# Patient Record
Sex: FEMALE
[De-identification: names, ages, dates, MRNs, and addresses within clinical notes are randomized per-mention and may not be internally consistent; named-entity substitution may affect disease eponyms.]

---

## 2018-09-12 NOTE — RAD REPORT
EXAM DESCRIPTION:  RAD - Chest Pa And Lat (2 Views) - 9/12/2018 7:17 pm

 

CLINICAL HISTORY:  Cough and congestion, body aches, chills

 

COMPARISON:  None.

 

TECHNIQUE:  PA and lateral views of the chest were obtained.

 

FINDINGS:  The lungs are clear of focal mass or consolidation. No failure or volume overload. Interst
itial markings are prominent with the baseline for the patient unknown.   Heart size is normal and ce
ntral vasculature is within normal limits.  No pleural effusion or pneumothorax seen.  No acute bony 
finding noted.  No aortic abnormality.

 

IMPRESSION:  Baseline exam showing prominent interstitial markings. Mild viral infiltrate or mild int
erstitial edema cannot be excluded.

 

No focal consolidation to suspect bacterial pneumonia.

## 2018-09-12 NOTE — EDPHYS
Physician Documentation                                                                           

 [facility]                                                                

Name: Halle Stanley                                                                              

Age: 53 yrs                                                                                       

Sex: Female                                                                                       

: 1965                                                                                   

MRN: [id number]                                                                                   

Arrival Date: 2018                                                                          

Time: 18:34                                                                                       

Account#: [id number]                                                                            

Bed 30                                                                                            

Private MD: WYATT RASMUSSEN                                                                       

ED Physician Roopa Floyd                                                                    

HPI:                                                                                              

                                                                                             

18:59 This 53 yrs old  Female presents to ER via Ambulatory with complaints of Flu    kb  

      Symptoms.                                                                                   

18:59 The patient or guardian reports cough, that is intermittent, described as mild, with no kb  

      sputum, flu symptoms, low-grade fever, myalgias. Onset: The symptoms/episode                

      began/occurred 1 week(s) ago. Severity of symptoms: At their worst the symptoms were        

      moderate, in the emergency department the symptoms are unchanged. Modifying factors:        

      The symptoms are alleviated by nothing, the symptoms are aggravated by nothing.             

      Associated signs and symptoms: Pertinent positives: nausea, rhinorrhea, sore throat,        

      Pertinent negatives: chest pain, diarrhea, ear ache, fever, vomiting. The patient has       

      not experienced similar symptoms in the past. The patient has been recently seen by a       

      physician: the ER physician, out of Town, 1 week(s) ago, with similar presenting            

      complaints, and apparently given a diagnosis of bronchitis, given zithromax, but the        

      patient's symptoms have persisted.                                                          

                                                                                                  

Historical:                                                                                       

- Allergies:                                                                                      

18:38 No Known Allergies;                                                                     sv  

- Home Meds:                                                                                      

18:38 None [Active];                                                                          sv  

- PMHx:                                                                                           

18:38 None;                                                                                   sv  

                                                                                                  

- Immunization history:: Adult Immunizations up to date.                                          

- Social history:: Smoking status: Patient/guardian denies using tobacco.                         

- Ebola Screening: : No symptoms or risks identified at this time.                                

                                                                                                  

                                                                                                  

ROS:                                                                                              

19:01 Neck: Negative for injury, pain, and swelling, Cardiovascular: Negative for chest pain, kb  

      palpitations, and edema, Abdomen/GI: Negative for abdominal pain, nausea, vomiting,         

      diarrhea, and constipation, Back: Negative for injury and pain, : Negative for            

      injury, bleeding, discharge, and swelling, MS/Extremity: Negative for injury and            

      deformity, Skin: Negative for injury, rash, and discoloration, Neuro: Negative for          

      headache, weakness, numbness, tingling, and seizure.                                        

19:01 Constitutional: Positive for body aches, fatigue, malaise, Negative for chills, fever,      

      poor PO intake, weight loss.                                                                

19:01 ENT: Positive for rhinorrhea, sinus congestion, sore throat.                                

19:01 Respiratory: Positive for cough, Negative for dyspnea on exertion, hemoptysis,              

      orthopnea, pleurisy, shortness of breath, wheezing.                                         

                                                                                                  

Exam:                                                                                             

19:02 Constitutional:  This is a well developed, well nourished patient who is awake, alert,  kb  

      and in no acute distress. Head/Face:  Normocephalic, atraumatic. ENT:  Nares patent. No     

      nasal discharge, no septal abnormalities noted.  Tympanic membranes are normal and          

      external auditory canals are clear.  Oropharynx with no redness, swelling, or masses,       

      exudates, or evidence of obstruction, uvula midline.  Mucous membranes moist. Neck:         

      Trachea midline, no thyromegaly or masses palpated, and no cervical lymphadenopathy.        

      Supple, full range of motion without nuchal rigidity, or vertebral point tenderness.        

      No Meningismus. Chest/axilla:  Normal chest wall appearance and motion.  Nontender with     

      no deformity.  No lesions are appreciated. Cardiovascular:  Regular rate and rhythm         

      with a normal S1 and S2.  No gallops, murmurs, or rubs.  Normal PMI, no JVD.  No pulse      

      deficits. Respiratory:  Lungs have equal breath sounds bilaterally, clear to                

      auscultation and percussion.  No rales, rhonchi or wheezes noted.  No increased work of     

      breathing, no retractions or nasal flaring. Abdomen/GI:  Soft, non-tender, with normal      

      bowel sounds.  No distension or tympany.  No guarding or rebound.  No evidence of           

      tenderness throughout. Skin:  Warm, dry with normal turgor.  Normal color with no           

      rashes, no lesions, and no evidence of cellulitis. MS/ Extremity:  Pulses equal, no         

      cyanosis.  Neurovascular intact.  Full, normal range of motion. Neuro:  Awake and           

      alert, GCS 15, oriented to person, place, time, and situation.  Cranial nerves II-XII       

      grossly intact.  Motor strength 5/5 in all extremities.  Sensory grossly intact.            

      Cerebellar exam normal.  Normal gait.                                                       

                                                                                                  

Vital Signs:                                                                                      

18:39  / 97; Pulse 93; Resp 18; Temp 98.7; Pulse Ox 99% ; Weight 90.72 kg; Height 5 ft. sv  

      2 in. (157.48 cm); Pain 5/10;                                                               

19:56  / 84; Pulse 93; Resp 18; Pulse Ox 98% on R/A;                                    aj1 

18:39 Body Mass Index 36.58 (90.72 kg, 157.48 cm)                                             sv  

                                                                                                  

MDM:                                                                                              

18:44 Patient medically screened.                                                             kb  

19:01 Data reviewed: vital signs, nurses notes. Data interpreted: Pulse oximetry: on room air kb  

      is 99 %. Interpretation: normal.                                                            

20:08 Counseling: I had a detailed discussion with the patient and/or guardian regarding: the kb  

      historical points, exam findings, and any diagnostic results supporting the                 

      discharge/admit diagnosis, lab results, radiology results, the need for outpatient          

      follow up, a family practitioner, to return to the emergency department if symptoms         

      worsen or persist or if there are any questions or concerns that arise at home.             

                                                                                                  

                                                                                             

18:50 Order name: Flu; Complete Time: 19:21                                                   kb  

                                                                                             

18:50 Order name: Strep; Complete Time: 19:19                                                 kb  

                                                                                             

18:50 Order name: Chest Pa And Lat (2 Views) XRAY; Complete Time: 19:45                       kb  

                                                                                             

19:19 Order name: Throat Culture                                                              EDMS

                                                                                                  

Administered Medications:                                                                         

19:01 Drug: Albuterol 2.5 mg Route: Inhalation;                                               tl3 

20:02 Follow up: Response: No adverse reaction                                                aj1 

19:01 Drug: AtroVENT Aerosol 0.5 mg Route: Inhalation;                                        tl3 

20:02 Follow up: Response: No adverse reaction                                                aj1 

                                                                                                  

                                                                                                  

Disposition:                                                                                      

18 20:08 Discharged to Home. Impression: Acute upper respiratory infection, unspecified.    

- Condition is Stable.                                                                            

- Discharge Instructions: Upper Respiratory Infection, Adult, Easy-to-Read.                       

- Prescriptions for Prednisone 20 mg Oral Tablet - take 1 tablet by ORAL route once               

  daily for 5 days; 5 tablet. Tessalon Perles 100 mg Oral Capsule - take 1 capsule by             

  ORAL route every 8 hours As needed; 15 capsule. Albuterol Sulfate 90 mcg/actuation -            

  inhale 1-2 puff by INHALATION route every 4-6 hours; 1 Inhaler.                                 

- Work release form, Medication Reconciliation Form, Thank You Letter, Antibiotic                 

  Education, Prescription Opioid Use form.                                                        

- Follow up: Emergency Department; When: As needed; Reason: Worsening of condition.               

  Follow up: Private Physician; When: 2 - 3 days; Reason: Recheck today's complaints,             

  Continuance of care, Re-evaluation by your physician.                                           

                                                                                                  

                                                                                                  

                                                                                                  

Addendum:                                                                                         

2018                                                                                        

     18:23 Co-signature as Attending Physician, Roopa Floyd MD.                               m
a2

                                                                                                  

Signatures:                                                                                       

Dispatcher [employer]                           Gifty Villatoro, MENDOZA-KAT BRAUNP-Pauline Helton RN                     RN   aj1                                                  

Ariadna Sykes, RN                    RN   Roopa Bhatt MD MD   ma2                                                  

Kelsey Mendoza RN                       RN   tl3                                                  

                                                                                                  

Corrections: (The following items were deleted from the chart)                                    

                                                                                             

20:15 20:08 2018 20:08 Discharged to Home. Impression: Acute upper respiratory          aj1 

      infection, unspecified. Condition is Stable. Forms are Medication Reconciliation Form,      

      Thank You Letter, Antibiotic Education, Prescription Opioid Use. Follow up: Emergency       

      Department; When: As needed; Reason: Worsening of condition. Follow up: Private             

      Physician; When: 2 - 3 days; Reason: Recheck today's complaints, Continuance of care,       

      Re-evaluation by your physician. kb                                                         

                                                                                                  

**************************************************************************************************

## 2018-09-12 NOTE — ER
Nurse's Notes                                                                                     

 [facility]                                                                

Name: Halle Stanley                                                                              

Age: 53 yrs                                                                                       

Sex: Female                                                                                       

: 1965                                                                                   

MRN: [id number]                                                                                   

Arrival Date: 2018                                                                          

Time: 18:34                                                                                       

Account#: [id number]                                                                            

Bed 30                                                                                            

Private MD: WYATT RASMUSSEN                                                                       

Diagnosis: Acute upper respiratory infection, unspecified                                         

                                                                                                  

Presentation:                                                                                     

                                                                                             

18:37 Presenting complaint: Patient states: cough, sinus pressure, bodyaches, chills. Was     sv  

      seen at [place] ER on Friday and  and dx w/ viral and bronchitis/sinusitis/sore     

      throat, sent home with prescriptions and does not feel any better. Transition of care:      

      patient was not received from another setting of care. Onset of symptoms was 2018. Care prior to arrival: None.                                                      

18:37 Method Of Arrival: Ambulatory                                                           sv  

18:37 Acuity: ANTONINA 4                                                                           sv  

18:41 Risk Assessment: Do you want to hurt yourself or someone else? Patient reports no       tw2 

      desire to harm self or others. Initial Sepsis Screen: Does the patient meet any 2           

      criteria? No. Patient's initial sepsis screen is negative. Does the patient have a          

      suspected source of infection? No. Patient's initial sepsis screen is negative.             

                                                                                                  

Historical:                                                                                       

- Allergies:                                                                                      

18:38 No Known Allergies;                                                                     sv  

- Home Meds:                                                                                      

18:38 None [Active];                                                                          sv  

- PMHx:                                                                                           

18:38 None;                                                                                   sv  

                                                                                                  

- Immunization history:: Adult Immunizations up to date.                                          

- Social history:: Smoking status: Patient/guardian denies using tobacco.                         

- Ebola Screening: : No symptoms or risks identified at this time.                                

                                                                                                  

                                                                                                  

Screenin:41 Abuse screen: Denies threats or abuse. Nutritional screening: No deficits noted.        tw2 

      Tuberculosis screening: No symptoms or risk factors identified. Fall Risk None              

      identified.                                                                                 

                                                                                                  

Assessment:                                                                                       

19:01 General: Appears in no apparent distress. comfortable, slender, well groomed, well      tl3 

      developed, well nourished, Behavior is calm, cooperative, appropriate for age. Pain:        

      Denies pain. Neuro: Level of Consciousness is awake, alert, obeys commands, Oriented to     

      person, place, time, situation, Appropriate for age. Cardiovascular: Patient's skin is      

      warm and dry. Respiratory: Airway is patent Respiratory effort is even, unlabored,          

      Respiratory pattern is regular, symmetrical, Breath sounds are clear bilaterally.           

      Respiratory: Reports cough that is labored breathing. GI: No signs and/or symptoms were     

      reported involving the gastrointestinal system. : No signs and/or symptoms were           

      reported regarding the genitourinary system. EENT: No signs and/or symptoms were            

      reported regarding the EENT system. Derm: No signs and/or symptoms reported regarding       

      the dermatologic system. Musculoskeletal: No signs and/or symptoms reported regarding       

      the musculoskeletal system.                                                                 

19:56 Reassessment: Patient appears in no apparent distress at this time. No changes from     aj1 

      previously documented assessment. Patient and/or family updated on plan of care and         

      expected duration. Pain level reassessed. Patient is alert, oriented x 3, equal             

      unlabored respirations, skin warm/dry/pink. Annalisa at bedside discussing POC with pt.      

                                                                                                  

Vital Signs:                                                                                      

18:39  / 97; Pulse 93; Resp 18; Temp 98.7; Pulse Ox 99% ; Weight 90.72 kg; Height 5 ft. sv  

      2 in. (157.48 cm); Pain 5/10;                                                               

19:56  / 84; Pulse 93; Resp 18; Pulse Ox 98% on R/A;                                    aj1 

18:39 Body Mass Index 36.58 (90.72 kg, 157.48 cm)                                             sv  

                                                                                                  

ED Course:                                                                                        

18:34 Patient arrived in ED.                                                                  sb2 

18:34 WYATT RASMUSSEN is Private Physician.                                                   sb2 

18:38 Triage completed.                                                                       sv  

18:38 Arm band placed on right wrist.                                                         sv  

18:40 Patient placed in an exam room.                                                         sv  

18:41 Bed in low position. Call light in reach.                                               tw2 

18:44 [facility]                                                        kb  

18:44 Roopa Floyd MD is Attending Physician.                                           kb  

18:47 Kelsey Mendoza, RN is Primary Nurse.                                                     tl3 

19:01 No provider procedures requiring assistance completed. Patient did not have IV access   tl3 

      during this emergency room visit.                                                           

19:16 X-ray completed. Patient tolerated procedure well.                                      ml  

19:18 Chest Pa And Lat (2 Views) XRAY In Process Unspecified.                                 EDMS

                                                                                                  

Administered Medications:                                                                         

19:01 Drug: Albuterol 2.5 mg Route: Inhalation;                                               tl3 

20:02 Follow up: Response: No adverse reaction                                                aj1 

19:01 Drug: AtroVENT Aerosol 0.5 mg Route: Inhalation;                                        tl3 

20:02 Follow up: Response: No adverse reaction                                                aj1 

                                                                                                  

                                                                                                  

Outcome:                                                                                          

20:08 Discharge ordered by MD. fisher  

20:15 Patient left the ED.                                                                    aj1 

                                                                                                  

Signatures:                                                                                       

Dispatcher [employer]                           Gifty Villatoro, KADEPJOSE BRAUNP-Pauline Helton, RN                     RN   aj1                                                  

Ariadna Sykes, RN                    RN   Nubia Mathis Tara, RN                          RN   tw2                                                  

Keke Walker                               2                                                  

Kelsey Mendoza RN                       RN   tl3                                                  

                                                                                                  

**************************************************************************************************

## 2019-10-13 ENCOUNTER — HOSPITAL ENCOUNTER (EMERGENCY)
Dept: HOSPITAL 97 - ER | Age: 54
Discharge: HOME | End: 2019-10-13
Payer: COMMERCIAL

## 2019-10-13 VITALS — SYSTOLIC BLOOD PRESSURE: 133 MMHG | DIASTOLIC BLOOD PRESSURE: 82 MMHG | OXYGEN SATURATION: 98 %

## 2019-10-13 VITALS — TEMPERATURE: 99.2 F

## 2019-10-13 DIAGNOSIS — S82.831A: Primary | ICD-10-CM
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
## 2019-10-13 PROCEDURE — 2W3QX1Z IMMOBILIZATION OF RIGHT LOWER LEG USING SPLINT: ICD-10-PCS

## 2019-10-13 PROCEDURE — 99284 EMERGENCY DEPT VISIT MOD MDM: CPT

## 2019-10-13 NOTE — ER
Nurse's Notes                                                                                     

 [facility]                                                                 

Name: Halle Stanley                                                                              

Age: 54 yrs                                                                                       

Sex: Female                                                                                       

: 1965                                                                                   

MRN: [id number]                                                                                   

Arrival Date: 10/13/2019                                                                          

Time: 19:35                                                                                       

Account#: [id number]                                                                            

Bed 14                                                                                            

Private MD:                                                                                       

Diagnosis: Distal right fibula avulsion fracture                                                  

                                                                                                  

Presentation:                                                                                     

10/13                                                                                             

19:48 Presenting complaint: Patient states: walking outside in the parking lot, hit the curb  rv  

      and sprained the right ankle. did not hit head and does not take any blood thinner.         

      Care prior to arrival: None. Mechanism of Injury: Fall.                                     

19:48 Acuity: ANTONINA 4                                                                           rv  

19:48 Method Of Arrival: Wheelchair                                                           rv  

19:54 Transition of care: patient was not received from another setting of care. Onset of     rv  

      symptoms was 2019 at 19:00. Risk Assessment: Do you want to hurt yourself       

      or someone else? Patient reports no desire to harm self or others. Initial Sepsis           

      Screen: Does the patient meet any 2 criteria? No. Patient's initial sepsis screen is        

      negative. Does the patient have a suspected source of infection? No. Patient's initial      

      sepsis screen is negative.                                                                  

                                                                                                  

OB/GYN:                                                                                           

19:49 LMP N/A - Post-menopause                                                                rv  

                                                                                                  

Historical:                                                                                       

- Allergies:                                                                                      

19:51 No Known Allergies;                                                                     rv  

- Home Meds:                                                                                      

19:51 None [Active];                                                                          rv  

- PMHx:                                                                                           

19:51 None;                                                                                   rv  

- PSHx:                                                                                           

19:51 None;                                                                                   rv  

                                                                                                  

- Immunization history:: Adult Immunizations up to date.                                          

- Social history:: Smoking status: Patient/guardian denies using tobacco, never smoked.           

- Ebola Screening: : No symptoms or risks identified at this time.                                

                                                                                                  

                                                                                                  

Screenin:53 Abuse screen: Denies threats or abuse. Denies injuries from another. Nutritional        rv  

      screening: No deficits noted. Tuberculosis screening: No symptoms or risk factors           

      identified. Fall Risk None identified.                                                      

                                                                                                  

Assessment:                                                                                       

19:52 General: Appears in no apparent distress. comfortable, Behavior is calm, cooperative.   rv  

      Pain: Complains of pain in right ankle. Neuro: Level of Consciousness is awake, alert,      

      obeys commands, Oriented to person, place, time, situation. Cardiovascular: Patient's       

      skin is warm and dry. Respiratory: Airway is patent. GI: No signs and/or symptoms were      

      reported involving the gastrointestinal system. : No signs and/or symptoms were           

      reported regarding the genitourinary system. EENT: No signs and/or symptoms were            

      reported regarding the EENT system. Derm: Skin is intact. Musculoskeletal: Swelling         

      present in right ankle.                                                                     

                                                                                                  

Vital Signs:                                                                                      

19:49  / 78; Pulse 79; Resp 18; Temp 99.2; Pulse Ox 96% ;                               rv  

22:01  / 82; Pulse 73; Resp 17; Pulse Ox 98% on R/A;                                    rv  

                                                                                                  

ED Course:                                                                                        

19:35 Patient arrived in ED.                                                                  ag3 

19:42 Cameron Talley NP is PHCP.                                                           pm1 

19:42 Kareem Rae MD is Attending Physician.                                              pm1 

19:47 Trae العراقي RN is Primary Nurse.                                                  rv  

19:49 Triage completed.                                                                       rv  

19:54 Arm band placed on right wrist. Affected limb iced.                                     rv  

19:54 Patient has correct armband on for positive identification. Bed in low position. Call   rv  

      light in reach. Side rails up X 1. Pulse ox on. NIBP on.                                    

20:31 XRAY Ankle RIGHT 3 view In Process Unspecified.                                         EDMS

21:39 No provider procedures requiring assistance completed. Patient did not have IV access   rv  

      during this emergency room visit. Orthoglass splint: stirrup splint applied on right        

      leg.                                                                                        

                                                                                                  

Administered Medications:                                                                         

21:03 Drug: Tylenol #3 (300 mg-30 mg) 2 tabs {Note: rass 0.} Route: PO;                       rv  

21:39 Follow up: Response: No adverse reaction; Marked relief of symptoms; Pain is decreased; rv  

      RASS: Alert and Calm (0)                                                                    

                                                                                                  

                                                                                                  

Outcome:                                                                                          

21:20 Discharge ordered by MD.                                                                pm1 

21:40 Discharged to home via wheelchair, with crutches.                                       rv  

21:40 Condition: good                                                                             

21:40 Discharge instructions given to patient, Instructed on discharge instructions, follow       

      up and referral plans. medication usage, crutch walking, Demonstrated understanding of      

      instructions, follow-up care, medications, crutch walking, splint care.                     

21:40 Prescriptions given X 1.                                                                rv  

22:02 Patient left the ED.                                                                    rv  

                                                                                                  

Signatures:                                                                                       

Dispatcher [employer]                           EDMS                                                 

Cameron Talley NP                    NP   pm1                                                  

Trae العراقي RN                    RN   rv                                                   

Malgorzata Lyman                                 ag3                                                  

                                                                                                  

Corrections: (The following items were deleted from the chart)                                    

21:02 21:02 Tylenol #3 (300 mg-30 mg) 2 tabs PO rv                                            rv  

                                                                                                  

**************************************************************************************************

## 2019-10-13 NOTE — EDPHYS
Physician Documentation                                                                           

 [facility]                                                                 

Name: Halle Stanley                                                                              

Age: 54 yrs                                                                                       

Sex: Female                                                                                       

: 1965                                                                                   

MRN: [id number]                                                                                   

Arrival Date: 10/13/2019                                                                          

Time: 19:35                                                                                       

Account#: [id number]                                                                            

Bed 14                                                                                            

Private MD:                                                                                       

ED Physician Kareem Rae                                                                       

HPI:                                                                                              

10/13                                                                                             

20:49 This 54 yrs old  Female presents to ER via Wheelchair with complaints of Fall   pm1 

      Injury.                                                                                     

20:49 The patient presents with pain, swelling. The complaints affect the right ankle. Onset: pm1 

      The symptoms/episode began/occurred just prior to arrival. Context: The problem was         

      sustained outside, tripped on curb, The mechanism of injury involved inversion of the       

      affected ankle. The patient is unable to bear weight. Associated signs and symptoms:        

      Pertinent positives: swelling, of the right ankle, Pertinent negatives: calf                

      tenderness, numbness, tingling. Modifying factors: The symptoms are alleviated by           

      elevation of extremity, the symptoms are aggravated by weight bearing. The patient has      

      not experienced similar symptoms in the past. No headache, head injury, neck pain, LOC.     

                                                                                                  

OB/GYN:                                                                                           

19:49 LMP N/A - Post-menopause                                                                rv  

                                                                                                  

Historical:                                                                                       

- Allergies:                                                                                      

19:51 No Known Allergies;                                                                     rv  

- Home Meds:                                                                                      

19:51 None [Active];                                                                          rv  

- PMHx:                                                                                           

19:51 None;                                                                                   rv  

- PSHx:                                                                                           

19:51 None;                                                                                   rv  

                                                                                                  

- Immunization history:: Adult Immunizations up to date.                                          

- Social history:: Smoking status: Patient/guardian denies using tobacco, never smoked.           

- Ebola Screening: : No symptoms or risks identified at this time.                                

                                                                                                  

                                                                                                  

ROS:                                                                                              

20:49 Constitutional: Negative for fever, chills, and weight loss, Eyes: Negative for injury, pm1 

      pain, redness, and discharge, ENT: Negative for injury, pain, and discharge, Neck:          

      Negative for injury, pain, and swelling, Cardiovascular: Negative for chest pain,           

      palpitations, and edema, Respiratory: Negative for shortness of breath, cough,              

      wheezing, and pleuritic chest pain, Abdomen/GI: Negative for abdominal pain, nausea,        

      vomiting, diarrhea, and constipation, Back: Negative for injury and pain.                   

20:49 Skin: Negative for injury, rash, and discoloration, Neuro: Negative for headache,           

      weakness, numbness, tingling, and seizure.                                                  

20:49 MS/extremity: Positive for of the right ankle lateral aspect, Negative for decreased        

      range of motion, deformity.                                                                 

                                                                                                  

Exam:                                                                                             

20:49 Constitutional:  This is a well developed, well nourished patient who is awake, alert,  pm1 

      and in no acute distress. Head/Face:  Normocephalic, atraumatic. Neck:  Trachea             

      midline, no thyromegaly or masses palpated, and no cervical lymphadenopathy.  Supple,       

      full range of motion without nuchal rigidity, or vertebral point tenderness.  No            

      Meningismus. Chest/axilla:  Normal chest wall appearance and motion.  Nontender with no     

      deformity.  No lesions are appreciated. Cardiovascular:  Regular rate and rhythm with a     

      normal S1 and S2.  No gallops, murmurs, or rubs.  Normal PMI, no JVD.  No pulse             

      deficits. Respiratory:  Lungs have equal breath sounds bilaterally, clear to                

      auscultation and percussion.  No rales, rhonchi or wheezes noted.  No increased work of     

      breathing, no retractions or nasal flaring. Back:  No spinal tenderness.  No                

      costovertebral tenderness.  Full range of motion. Skin:  Warm, dry with normal turgor.      

      Normal color with no rashes, no lesions, and no evidence of cellulitis.                     

20:49 Musculoskeletal/extremity: Extremities: grossly normal except: noted in the lateral         

      aspect of right ankle: There is no evidence of  decreased ROM, deformity.                   

20:49 Neuro: Orientation: is normal, Motor: is normal, moves all fours, Sensation: is normal,     

      no obvious gross deficits.                                                                  

                                                                                                  

Vital Signs:                                                                                      

19:49  / 78; Pulse 79; Resp 18; Temp 99.2; Pulse Ox 96% ;                               rv  

22:01  / 82; Pulse 73; Resp 17; Pulse Ox 98% on R/A;                                    rv  

                                                                                                  

Procedures:                                                                                       

22:05 Splinting: Splint applied to right ankle using Orthoglass splint, applied by tech.      pm1 

      Examined by me, post splint application: neurovascular intact, 2+ distal pulses             

      palpable, brisk capillary refill noted, Patient tolerated well.                             

                                                                                                  

MDM:                                                                                              

19:45 Patient medically screened.                                                             pm1 

21:12 Data reviewed: vital signs. Data interpreted: Pulse oximetry: on room air is 96 %.      pm1 

      Interpretation: normal. Counseling: I had a detailed discussion with the patient and/or     

      guardian regarding: the historical points, exam findings, and any diagnostic results        

      supporting the discharge/admit diagnosis, radiology results, the need for outpatient        

      follow up, a orthopedic surgeon, to return to the emergency department if symptoms          

      worsen or persist or if there are any questions or concerns that arise at home.             

                                                                                                  

10/13                                                                                             

19:48 Order name: XRAY Ankle RIGHT 3 view                                                     rv  

10/13                                                                                             

21:12 Order name: Splint - Ankle: Orthoglass: Stirrup; Complete Time: 21:39                   pm1 

10/13                                                                                             

21:12 Order name: Crutches; Complete Time: 21:39                                              pm1 

                                                                                                  

Administered Medications:                                                                         

21:03 Drug: Tylenol #3 (300 mg-30 mg) 2 tabs {Note: rass 0.} Route: PO;                       rv  

21:39 Follow up: Response: No adverse reaction; Marked relief of symptoms; Pain is decreased; rv  

      RASS: Alert and Calm (0)                                                                    

                                                                                                  

                                                                                                  

Disposition:                                                                                      

22:52 Co-signature as Attending Physician, Kareem Rae MD.                                    

                                                                                                  

Disposition:                                                                                      

10/13/19 21:20 Discharged to Home. Impression: Distal right fibula avulsion fracture.             

- Condition is Stable.                                                                            

- Discharge Instructions: Ankle Fracture, Cast or Splint Care, Adult, Crutch Use.                 

- Prescriptions for Tylenol- Codeine #3 300-30 mg Oral Tablet - take 2 tablets by ORAL            

  route every 6 hours As needed; 20 tablet.                                                       

- Medication Reconciliation Form, Thank You Letter, Antibiotic Education, Prescription            

  Opioid Use form.                                                                                

- Follow up: Emergency Department; When: As needed; Reason: Worsening of condition.               

  Follow up: Private Physician; When: 2 - 3 days; Reason: Recheck today's complaints,             

  Continuance of care, Re-evaluation by your physician.                                           

- Problem is new.                                                                                 

- Symptoms have improved.                                                                         

                                                                                                  

                                                                                                  

                                                                                                  

Signatures:                                                                                       

Dispatcher [employer]                           EDMS                                                 

Cameron Talley, NP                    NP   pm1                                                  

Kareem Rae MD MD                                                      

Trae العراقي RN                    RN   rv                                                   

                                                                                                  

Corrections: (The following items were deleted from the chart)                                    

22:02 21:20 10/13/2019 21:20 Discharged to Home. Impression: Distal right fibula avulsion     rv  

      fracture. Condition is Stable. Forms are Medication Reconciliation Form, Thank You          

      Letter, Antibiotic Education, Prescription Opioid Use. Follow up: Emergency Department;     

      When: As needed; Reason: Worsening of condition. Follow up: Private Physician; When: 2      

      - 3 days; Reason: Recheck today's complaints, Continuance of care, Re-evaluation by         

      your physician. Problem is new. Symptoms have improved. pm1                                 

                                                                                                  

**************************************************************************************************

## 2019-10-14 NOTE — RAD REPORT
EXAM DESCRIPTION:  RAD - Ankle Right 3 View - 10/13/2019 8:31 pm

 

CLINICAL HISTORY:  Right ankle pain, trauma

 

COMPARISON:  None.

 

FINDINGS:  No gross fracture deformity is seen. There is a small focus of cortical irregularity at th
e anterior margin of the tibia at the tibiotalar joint line. A small bone avulsion is suspected. Manuela
elation is needed with pain localizing to the anterior joint line.

 

No other evidence for fracture. Dome of the talus is normally positioned. No joint effusion seen. No 
joint space narrowing.

 

Mild lateral soft tissue swelling present.

 

IMPRESSION:  Suspected small 4-5 mm bone avulsion from the anterior margin of the tibia at the tibiot
alar joint.

## 2023-03-17 ENCOUNTER — HOSPITAL ENCOUNTER (EMERGENCY)
Dept: HOSPITAL 97 - ER | Age: 58
Discharge: HOME | End: 2023-03-17
Payer: COMMERCIAL

## 2023-03-17 VITALS — OXYGEN SATURATION: 94 % | SYSTOLIC BLOOD PRESSURE: 139 MMHG | DIASTOLIC BLOOD PRESSURE: 91 MMHG

## 2023-03-17 VITALS — TEMPERATURE: 98 F

## 2023-03-17 DIAGNOSIS — S82.002A: Primary | ICD-10-CM

## 2023-03-17 PROCEDURE — 99284 EMERGENCY DEPT VISIT MOD MDM: CPT

## 2023-03-17 PROCEDURE — 73562 X-RAY EXAM OF KNEE 3: CPT

## 2023-03-17 PROCEDURE — 96375 TX/PRO/DX INJ NEW DRUG ADDON: CPT

## 2023-03-17 PROCEDURE — 96374 THER/PROPH/DIAG INJ IV PUSH: CPT

## 2023-03-17 NOTE — RAD REPORT
EXAM DESCRIPTION:  RAD - Knee Left 3 View - 3/17/2023 12:20 pm

 

CLINICAL HISTORY:  PAIN

 

COMPARISON:  No comparisons

 

TECHNIQUE:  Left knee, 3 views.

 

FINDINGS:  Transverse fracture across the patella with distraction of the superior fragment. Mild com
minution along the inferior fragment. Prominent soft tissue swelling anteriorly, could reflect a smal
l hematoma. .No joint effusion seen. No joint space narrowing.

 

IMPRESSION:  Transverse fracture across the patella. Mild comminution of the inferior fragment. Distr
action and superior displacement of the superior fragment. Probable soft tissue hematoma in the inter
vening space.

## 2023-03-17 NOTE — XMS REPORT
Continuity of Care Document

                            Created on:2023



Patient:KELLEE SAMANIEGO

Sex:Female

:1965

External Reference #:[id number]





Demographics







                          Address                   [address]



                                                    [place] [address]

 

                          Home Phone                [phone]

 

                          Work Phone                [phone]

 

                          Email Address             NONE

 

                          Preferred Language        English

 

                          Marital Status            Unknown

 

                          Mormonism Affiliation     Unknown

 

                          Race                      Unknown

 

                          Additional Race(s)        Unavailable

 

                          Ethnic Group              Unknown









Author







                          Organization              [facility]

t

 

                          Address                   [address]



                                                    [place] [address]

 

                          Phone                     [phone]









Support







                Name            Relationship    Address         Phone

 

                UNK, UNK        Unavailable     .               [phone]



                                                [place] [address] 

 

                UNK, UNK        Unavailable     .               [phone]



                                                [place] [address] 

 

                ARNULFO SANTOS    Unavailable     [address]     [phone]



                                                [address]        



                                                [place] [address] 

 

                LISANDRO BANEGAS    Unavailable     [address]     [phone]



                                                [address]        



                                                [place] [address] 

 

                MD LILLY HENDRICKS Emergency Provider [address]  [phone]



                                                [place] [address] 

 

                MENDOZA RASMUSSEN Primary Care Physician [address]    [phone]



                                                [place] [address] 

 

                LISANDRO BANEGAS    Child           [address]          Unavailable



                                                [place] [address] 









Care Team Providers







                    Name                Role                Phone

 

                    Abraham Plummer     Attending Clinician Unavailable

 

                    MARYSOLREEN_SHU     Attending Clinician Unavailable

 

                    Physician, No Primary or Family Admitting Clinician Unavaila

ble

 

                    MARYSOLREEN_ANUELA     Admitting Clinician Unavailable









Payers







           Payer Name Policy Type Policy Number Effective Date Expiration Date S

tony

 

           [place]            [id number] 2015 00:00:00         

   



           [place]                                               







Problems

This patient has no known problems.



Allergies, Adverse Reactions, Alerts







       Allergy Allergy Status Severity Reaction(s) Onset  Inactive Treating Comm

ents 

Source



       Name   Type                        Date   Date   Clinician        

 

       No Known DA     Active U             2020-0                      [facility]



       Allergie                                                     [place]



       s                                  00:00:                      [facility]



                                          00                          d[facility]



                                                                      [facility]



                                                                      l

 

       No Known DA     Active U             2020-0                      [facility]



       Allergie                                                     [place]



       s                                  00:00:                      [facility]



                                          00                          dic



                                                                      [facility]



                                                                      l







Medications

This patient has no known medications.



Procedures

This patient has no known procedures.



Encounters







        Start   End     Encounter Admission Attending Care    Care    Encounter 

Source



        Date/Time Date/Time Type    Type    Clinicians Facility Department ID   

   

 

        2020         Inpatient ANABELLE Dozier   [facility]    [id number] 

[facility]



        14:30:00                         Abraham                   [address]      [place]



                                                                        [facility]



                                                                        d[facility]



                                                                        Hospita



                                                                        l

 

        2022 Outpatient         [facility] [place] [address]

 [place]



        04:34:00 04:34:00                 [facility]                    [address]    da



                                                                        [facility]



                                                                        [facility]re[facility]



                                                                        h



                                                                        Program

 

        2022 Outpatient         [facility] [place] [address]

 [place]



        04:34:00 04:34:00                 [facility]                    0718    da



                                                                        [facility]



                                                                        [facility]re[facility]



                                                                        h



                                                                        Program

 

        2022 Outpatient         [facility] [place] [address]

 [place]



        12:50:00 12:50:00                 [facility]                    0422    da



                                                                        [facility]



                                                                        [facility]



                                                                        Outreac



                                                                        h



                                                                        Program

 

        2021 Outpatient         ANABEL[facility] Plummer   [facility]   [id number]

[facility]



        11:55:37 11:55:37                 Abraham Mcnair      [place]



                                                                        [facility]



                                                                        d[facility]



                                                                        Hospita



                                                                        l







Results







           Test Description Test Time  Test Comments Results    Result     Sourc

e



                                                       Comments   

 

           - MRI LW JNT W/O 2021            *********************         

   



           CONT RT    13:06:00              *********************            



                                            ******************[facility]            



                                            HOSPITALName:            



                                            KELLEE SAMANIEGO :            



                                            1965 Sex:            



                                            F********************            



                                            *********************            



                                            *******************            



                                            Patient Name:            



                                            KELLEE SAMANIEGO Unit            



                                            No: [id number]            



                                            EXAMS: CPT CODE:            



                                            [id number] MRI LW JNT            



                                            W/[place] [address] MRI            



                                            RIGHT ANKLE            



                                            DIAGNOSIS: 1. There            



                                            is a moderate chronic            



                                            appearing OCD lesion            



                                            which involves the            



                                            medial aspect of the            



                                            talar dome. There is            



                                            focal thinning of the            



                                            overlying subchondral            



                                            bone plate without            



                                            evidence of collapse.            



                                            2. There is mild to            



                                            moderate distal            



                                            posterior tibial            



                                            tendon tenosynovitis.            



                                            3. There is moderate            



                                            to marked             



                                            tenosynovitis of the            



                                            peroneal tendons at            



                                            the level of and            



                                            distal to the lateral            



                                            malleolus. There is            



                                            also tendinosis of            



                                            the distal peroneus            



                                            longus tendon. 4.            



                                            There is focal            



                                            chronic appearing            



                                            thickening of the            



                                            medial band proximal            



                                            plantar fascia            



                                            suggesting changes            



                                            secondary to chronic            



                                            plantar fasciitis. 5.            



                                            Focal degenerative            



                                            subcortical cystic            



                                            change involves the            



                                            superior aspect of            



                                            the cuboid adjacent            



                                            to the calcaneocuboid            



                                            joint. INDICATION:            



                                            PAIN COMPARISON: None            



                                            TECHNIQUE:            



                                            Multiplanar,            



                                            multisequence MRI is            



                                            obtained of the right            



                                            ankle without            



                                            contrast. FINDINGS:            



                                            Laterally, the            



                                            anterior and            



                                            posterior talofibular            



                                            ligaments are intact.            



                                            The calcaneofibular            



                                            ligament is intact.            



                                            The anterior and the            



                                            posterior syndesmotic            



                                            ligaments are intact.            



                                            The superior and            



                                            inferior peroneal            



                                            retinacula are            



                                            intact. Tenosynovitis            



                                            and tendinosis of the            



                                             peroneus longus and            



                                            brevis tendons are            



                                            intact. Medially, the            



                                            deltoid ligament is            



                                            intact. The spring            



                                            ligament is intact.            



                                            Tenosynovitis of the            



                                            distal posterior            



                                            tibial tendon. The            



                                            flexor digitorum            



                                            longus and flexor            



                                            hallucis longus            



                                            tendons are intact.            



                                            The tibialis anterior            



                                            tendon and extensor            



                                            tendons are intact.            



                                            The sinus tarsi is            



                                            normal. No fluid in            



                                            the retrocalcaneal            



                                            bursa. No high-grade            



                                            narrowing of the            



                                            tibiotalar joint is            



                                            seen. The Lisfranc            



                                            ligament is intact.            



                                            The Achilles tendon            



                                            is intact. Findings            



                                            involving the plantar            



                                            fascia are as            



                                            described. There is            



                                            no evidence of            



                                            muscular edema or            



                                            atrophy. Focal OCD            



                                            lesion medial talar            



                                            dome as described            



                                            above. [facility]            



                                            NAME: KELLEE SAMANIEGO            



                                            [address] PHYS:            



                                            Abraham Willams MD : 1965            



                                            AGE: 55 SEX: F            



                                            [place], [place] [address]            



                                            ACCT NO: [id number]            



                                            LOC: Y.MRI PHONE #:            



                                            [phone] EXAM            



                                            DATE: 2021            



                                            STATUS: REG CLI FAX            



                                            #: [phone] RAD            



                                            #: D/C DT PAGE 1            



                                            Signed Report            



                                            (CONTINUED) Patient            



                                            Name: KELLEE SAMANIEGO            



                                            Unit No: [id number]            



                                            EXAMS: CPT CODE:            



                                            [id number] MRI LW JNT            



                                            W/[place] [address]            



                                            (Continued) **            



                                            Electronically Signed            



                                            by FABY Anderson MD            



                                            on 2021 at 1306            



                                            ** Reported and            



                                            signed by: FABY Anderson MD CC: Abraham Plummer MD            



                                            Technologist: LAYNE Magdaleno(R)            



                                            Transcribed D/T:            



                                            2021 (1306)            



                                            [facility]            



                                            NAME: SARAHY SAMANIEGO[address] PHYS:            



                                            Abraham Willams MD : 1965            



                                            AGE: 55 SEX: F            



                                            [place] [address]            



                                            ACCT NO: [id number]            



                                            LOC: Y.MRI PHONE #:            



                                            [phone] EXAM            



                                            DATE: 2021            



                                            STATUS: REG CLI FAX            



                                            #: [phone] RAD            



                                            #: D/C DT PAGE 2            



                                            Signed Report            



                                            Patient Name:            



                                            KELLEE SAMANIEGO Unit            



                                            No: [id number] EXAMS:            



                                            CPT CODE: [id number]            



                                            MRI LW JNT W/O CONT            



                                            RT  [address] (Continued)            



                                            Orig Print D/T: S:            



                                            2021 (1309)            



                                            [facility] NAME:            



                                            [address] PHYS:            



                                            Abraham Willams MD : 1965            



                                            AGE: 55 SEX: F            



                                            [place] [address]            



                                            ACCT NO: [id number]            



                                            LOC: Y.MRI PHONE #:            



                                            [phone] EXAM            



                                            DATE: 2021            



                                            STATUS: REG CLI FAX            



                                            #: [phone] RAD            



                                            #: D/C DT PAGE 3            



                                            Signed Report            

 

           - XR CHEST 1 V 2020             Patient Name:            



                      13:02:00              KELLEE SAMANIEGO Unit            



                                            No: [id number] EXAMS:            



                                             CPT CODE: [id number]            



                                            XR CHEST 1 V 44102            



                                            IMAGES PROVIDED: One            



                                            frontal view of the            



                                            chest is provided.            



                                            COMPARISON: None            



                                            FINDINGS: The lungs            



                                            are grossly clear. No            



                                            pneumothorax or            



                                            pleural effusion. No            



                                            focal consolidation            



                                            is visualized.            



                                            Cardiac silhouette is            



                                            accentuated by            



                                            decreased lung            



                                            volume. Coronary            



                                            vasculature is upper            



                                            limits of normal. No            



                                            acute osseous            



                                            abnormality.            



                                            IMPRESSION: No acute            



                                            findings. **            



                                            Electronically Signed            



                                            by DEVONTE Rivera ** ** on            



                                            2020 at 1302 **            



                                            Reported and signed            



                                            by: Aguilar Rivera M.D.  CC: Abraham Plummer MD;            



                                            Clark Medrano MD            



                                            Technologist:            



                                            ANTONIA DONOHUE. RT(R)            



                                            Transcribed D/T:            



                                            2020 (0783)            



                                            [facility]            



                                            NAME: KELLEE SAMANIEGO            



                                            [address] [place] [address] PHYS:            



                                            Clark Bates MD            



                                            : 1965 AGE:            



                                            54 SEX: F [place] [address] ACCT NO:            



                                            [id number] LOC:            



                                            [facility] PHONE #:            



                                            [phone] EXAM            



                                            DATE: 2020            



                                            STATUS: REG [facility] FAX            



                                            #: [phone] RAD            



                                            #: D/C DT PAGE 1            



                                            Signed Report Patient            



                                            Name: KELLEE SAMANIEGO            



                                            Unit No: [id number]            



                                            EXAMS: CPT CODE:            



                                            [id number] XR CHEST 1            



                                            V 81126 (Continued)            



                                            Orig Print D/T: S:            



                                            2020 (1407)            



                                            [facility] NAME:            



                                            KELLEE SAMANIEGO [address] [place] [address] PHYS:            



                                            Clark Bates MD            



                                            : 1965 AGE:            



                                            54 SEX: F [place] [address] ACCT NO:            



                                            [id number] LOC:            



                                            [facility] PHONE #:            



                                            [phone] EXAM            



                                            DATE: 2020            



                                            STATUS: REG [facility] FAX            



                                            #: [phone] RAD            



                                            #: D/C DT PAGE 2            



                                            Signed Report            









                    Coronavirus  nCoV Bedside 2020 12:49:00 









                      Test Item  Value      Reference Range Interpretation Comme

nts









             Coronavirus 2019 nCoV Bedside (test code = COVNONPUIBED) Negative

## 2023-03-17 NOTE — ER
Nurse's Notes                                                                                     

 [facility]                                                                 

Name: Halle Stanley                                                                              

Age: 57 yrs                                                                                       

Sex: Female                                                                                       

: 1965                                                                                   

MRN: [id number]                                                                                   

Arrival Date: 2023                                                                          

Time: 11:13                                                                                       

Account#: [id number]                                                                            

Bed 4                                                                                             

Private MD:                                                                                       

Diagnosis: Fracture of patella                                                                    

                                                                                                  

Presentation:                                                                                     

                                                                                             

11:13 Chief complaint: Patient states: Slip on oily surface 30 min PTA. L knee pain since. No ll1 

      LOC or head injury. Coronavirus screen: Vaccine status: Patient reports receiving the       

      2nd dose of the covid vaccine. Client denies travel out of the [place] in the last 14          

      days. At this time, the client does not indicate any symptoms associated with               

      coronavirus-19. Ebola Screen: Patient denies travel to an Ebola-affected area in the 21     

      days before illness onset. Initial Sepsis Screen: Does the patient meet any 2 criteria?     

      No. Patient's initial sepsis screen is negative. Does the patient have a suspected          

      source of infection? No. Patient's initial sepsis screen is negative. Risk Assessment:      

      Do you want to hurt yourself or someone else? Patient reports no desire to harm self or     

      others. Onset of symptoms was 2023.                                               

11:13 Method Of Arrival: EMS: [facility]                                                    ll1 

11:13 Acuity: ANTONINA 3                                                                           ll1 

                                                                                                  

Triage Assessment:                                                                                

11:15 General: Appears uncomfortable, Behavior is calm, cooperative, appropriate for age.     ll1 

      Pain: Complains of pain in L knee Quality of pain is described as aching.                   

      Musculoskeletal: Circulation, motion, and sensation intact. Capillary refill < 3            

      seconds, Tenderness present in L knee. Injury Description: Bruise sustained to left leg.    

                                                                                                  

Historical:                                                                                       

- Allergies:                                                                                      

11:15 No Known Allergies;                                                                     ll1 

- PMHx:                                                                                           

11:15 None;                                                                                   ll1 

- PSHx:                                                                                           

11:15 None;                                                                                   ll1 

                                                                                                  

- Immunization history:: Adult Immunizations up to date.                                          

- Social history:: Smoking status: Patient denies any tobacco usage or history of.                

- Family history:: not pertinent.                                                                 

                                                                                                  

                                                                                                  

Screenin:24 [facility] ED Fall Risk Assessment (Adult) History of falling in the last 3 months,       ll1 

      including since admission Yes- single mechanical fall (1 pt) Impaired Gait Yes (1 pt)       

      Mobility Assist Device Used Yes (1 pt) Score/Fall Risk Level 3 or more points = High        

      Risk Oriented to surroundings, Maintained a safe environment, Educated pt \T\ family on     

      fall prevention, incl call for assistance when getting out of bed, Hourly rounding          

      (assess needs \T\ fall precautionary measures) done, Remained with patient while            

      ambulating. Abuse screen: Denies threats or abuse. Nutritional screening: No deficits       

      noted. Tuberculosis screening: No symptoms or risk factors identified.                      

                                                                                                  

Assessment:                                                                                       

11:24 Reassessment: No changes from previously documented assessment. Patient and/or family   ll1 

      updated on plan of care and expected duration. Pain level reassessed. Patient is alert,     

      oriented x 3, equal unlabored respirations, skin warm/dry/pink.                             

11:53 Reassessment: No changes from previously documented assessment. Patient and/or family   ll1 

      updated on plan of care and expected duration. Pain level reassessed. Patient is alert,     

      oriented x 3, equal unlabored respirations, skin warm/dry/pink.                             

12:03 Reassessment: No changes from previously documented assessment. Patient and/or family   ll1 

      updated on plan of care and expected duration. Pain level reassessed. Patient is alert,     

      oriented x 3, equal unlabored respirations, skin warm/dry/pink.                             

12:50 Reassessment: No changes from previously documented assessment. Patient and/or family   ll1 

      updated on plan of care and expected duration. Pain level reassessed. Patient is alert,     

      oriented x 3, equal unlabored respirations, skin warm/dry/pink.                             

12:58 Reassessment: No changes from previously documented assessment. urinated on bed pan.    ll1 

      Tolerated well.                                                                             

                                                                                                  

Vital Signs:                                                                                      

11:13  / 76; Pulse 100; Resp 18; Temp 98; Pulse Ox 100% ; Pain 10/10;                   ll1 

12:03  / 76; Pulse 96; Resp 17; Pulse Ox 98% on R/A;                                    ll1 

12:50  / 91; Pulse 91; Pulse Ox 94% on R/A;                                             ll1 

11:13 Pain Scale: Adult                                                                       ll1 

                                                                                                  

ED Course:                                                                                        

11:13 Patient arrived in ED.                                                                  ll1 

11:13 Partha Gardner MD is Attending Physician.                                            rt  

11:15 Triage completed.                                                                       ll1 

11:15 Valerie Cruz, RN is Primary Nurse.                                                     ll1 

11:16 Arm band placed on Patient placed in an exam room, on a stretcher.                      ll1 

11:25 No provider procedures requiring assistance completed. Inserted saline lock: 20 gauge   ll1 

      in right antecubital area, using aseptic technique. Blood collected.                        

12:03 Patient has correct armband on for positive identification. Bed in low position. Call   ll1 

      light in reach. Side rails up X2. Client placed on continuous cardiac and pulse             

      oximetry monitoring. NIBP monitoring applied.                                               

12:22 Knee Left 3 View XRAY In Process Unspecified.                                           EDMS

13:12 Sunny Cadena MD is Referral Physician.                                                rt  

                                                                                                  

Administered Medications:                                                                         

11:25 Drug: morphine IVP or IV 4 mg Route: IVP; Infused Over: 4 mins; Site: right antecubital;ll1 

12:02 Follow up: Response: No adverse reaction; Pain is decreased; RASS: Alert and Calm (0)   ll1 

11:25 Drug: Ondansetron IVP 4 mg Route: IVP; Site: right antecubital;                         ll1 

12:02 Follow up: Response: No adverse reaction                                                ll1 

12:02 Drug: morphine IVP or IV 4 mg {Note: pain 5/10 RASS 0.} Route: IVP; Infused Over: 4     ll1 

      mins; Site: right antecubital;                                                              

12:58 Follow up: Response: No adverse reaction; Pain is decreased; RASS: Alert and Calm (0)   ll1 

                                                                                                  

                                                                                                  

Medication:                                                                                       

11:25 VIS not applicable for this client.                                                     ll1 

                                                                                                  

Outcome:                                                                                          

13:13 Discharge ordered by MD.                                                                rt  

                                                                                                  

Signatures:                                                                                       

Dispatcher [employer]                           Valerie Kahn, LUIZ                       RN   ll1                                                  

Partha Gardner MD MD   rt                                                   

                                                                                                  

**************************************************************************************************

## 2023-03-17 NOTE — EDPHYS
Physician Documentation                                                                           

 [facility]                                                                 

Name: Halle Stanley                                                                              

Age: 57 yrs                                                                                       

Sex: Female                                                                                       

: 1965                                                                                   

MRN: [id number]                                                                                   

Arrival Date: 2023                                                                          

Time: 11:13                                                                                       

Account#: [id number]                                                                            

Bed 4                                                                                             

Private MD:                                                                                       

ED Physician Partha Gardner                                                                     

HPI:                                                                                              

                                                                                             

11:23 This 57 yrs old  Female presents to ER via EMS with complaints of Knee Injury.  rt  

11:23 Patient presents to the ED with slip and fall. She states that she slipped on an [place]   rt  

      surface, causing her to fall. She denies hitting her head. She states that she felt a       

      pop at her left knee causing a significant amount of pain. She adamantly denies knee        

      dislocation, denies hip pain. Denies other acute complaints at this time. Pain is           

      aching nature, nonradiating, severe in severity, no other aggravating or alleviating        

      factors..                                                                                   

                                                                                                  

Historical:                                                                                       

- Allergies:                                                                                      

11:15 No Known Allergies;                                                                     ll1 

- PMHx:                                                                                           

11:15 None;                                                                                   ll1 

- PSHx:                                                                                           

11:15 None;                                                                                   ll1 

                                                                                                  

- Immunization history:: Adult Immunizations up to date.                                          

- Social history:: Smoking status: Patient denies any tobacco usage or history of.                

- Family history:: not pertinent.                                                                 

                                                                                                  

                                                                                                  

ROS:                                                                                              

11:23 Constitutional: Negative for fever, chills, and weight loss, Neck: Negative for injury, rt  

      pain, and swelling, Cardiovascular: Negative for chest pain, palpitations, and edema,       

      Respiratory: Negative for shortness of breath, cough, wheezing, and pleuritic chest         

      pain, Abdomen/GI: Negative for abdominal pain, nausea, vomiting, diarrhea, and              

      constipation, Back: Negative for injury and pain, Skin: Negative for injury, rash, and      

      discoloration, Neuro: Negative for headache, weakness, numbness, tingling, and seizure,     

      Psych: Negative for depression, anxiety, suicide ideation, homicidal ideation, and          

      hallucinations.                                                                             

11:23 MS/extremity: Positive for injury or acute deformity, pain.                                 

                                                                                                  

Exam:                                                                                             

11:23 Musculoskeletal/extremity: Swelling, tenderness to the left knee, no hip tenderness, no rt  

      other tenderness in the left lower extremity.  Pulses, motor, sensation intact..            

                                                                                                  

Vital Signs:                                                                                      

11:13  / 76; Pulse 100; Resp 18; Temp 98; Pulse Ox 100% ; Pain 10/10;                   ll1 

12:03  / 76; Pulse 96; Resp 17; Pulse Ox 98% on R/A;                                    ll1 

12:50  / 91; Pulse 91; Pulse Ox 94% on R/A;                                             ll1 

11:13 Pain Scale: Adult                                                                       ll1 

                                                                                                  

MDM:                                                                                              

11:13 Patient medically screened.                                                             rt  

                                                                                                  

                                                                                             

11:16 Order name: IV Saline Lock; Complete Time: 11:16                                        ll1 

                                                                                             

11:15 Order name: Knee Left 3 View XRAY; Complete Time: 12:34                                 rt  

                                                                                             

13:12 Order name: Crutches                                                                    rt  

                                                                                                  

Administered Medications:                                                                         

11:25 Drug: morphine IVP or IV 4 mg Route: IVP; Infused Over: 4 mins; Site: right antecubital;ll1 

12:02 Follow up: Response: No adverse reaction; Pain is decreased; RASS: Alert and Calm (0)   ll1 

11:25 Drug: Ondansetron IVP 4 mg Route: IVP; Site: right antecubital;                         ll1 

12:02 Follow up: Response: No adverse reaction                                                1 

12:02 Drug: morphine IVP or IV 4 mg {Note: pain 5/10 RASS 0.} Route: IVP; Infused Over: 4     ll1 

      mins; Site: right antecubital;                                                              

12:58 Follow up: Response: No adverse reaction; Pain is decreased; RASS: Alert and Calm (0)   ll1 

                                                                                                  

                                                                                                  

Disposition Summary:                                                                              

23 13:13                                                                                    

Discharge Ordered                                                                                 

      Location: Home                                                                          rt  

      Problem: new                                                                            rt  

      Symptoms: have improved                                                                 rt  

      Condition: Stable                                                                       rt  

      Diagnosis                                                                                   

        - Fracture of patella                                                                 rt  

      Followup:                                                                               rt  

        - With: Sunny Cadena MD                                                                  

        - When: 5 - 6 days                                                                         

        - Reason:                                                                                  

      Forms:                                                                                      

        - Medication Reconciliation Form                                                      rt  

        - Thank You Letter                                                                    rt  

        - Antibiotic Education                                                                rt  

        - Prescription Opioid Use                                                             rt  

Signatures:                                                                                       

Dispatcher [employer]                           Valerie Kahn, RN                       RN   ll1                                                  

Partha Gardner MD MD   rt                                                   

                                                                                                  

**************************************************************************************************